# Patient Record
Sex: MALE | Employment: FULL TIME | URBAN - METROPOLITAN AREA
[De-identification: names, ages, dates, MRNs, and addresses within clinical notes are randomized per-mention and may not be internally consistent; named-entity substitution may affect disease eponyms.]

---

## 2017-01-25 ENCOUNTER — OFFICE VISIT (OUTPATIENT)
Dept: URGENT CARE | Facility: PHYSICIAN GROUP | Age: 27
End: 2017-01-25
Payer: COMMERCIAL

## 2017-01-25 VITALS
WEIGHT: 190 LBS | SYSTOLIC BLOOD PRESSURE: 120 MMHG | RESPIRATION RATE: 14 BRPM | HEART RATE: 62 BPM | TEMPERATURE: 97.9 F | OXYGEN SATURATION: 99 % | BODY MASS INDEX: 26.6 KG/M2 | DIASTOLIC BLOOD PRESSURE: 78 MMHG | HEIGHT: 71 IN

## 2017-01-25 DIAGNOSIS — T14.8XXA HEMATOMA: ICD-10-CM

## 2017-01-25 PROCEDURE — 99214 OFFICE O/P EST MOD 30 MIN: CPT | Performed by: FAMILY MEDICINE

## 2017-01-25 ASSESSMENT — ENCOUNTER SYMPTOMS
WEAKNESS: 0
NUMBNESS: 0
JOINT SWELLING: 0

## 2017-01-25 NOTE — PATIENT INSTRUCTIONS
Hematoma  A hematoma is a collection of blood under the skin, in an organ, in a body space, in a joint space, or in other tissue. The blood can clot to form a lump that you can see and feel. The lump is often firm and may sometimes become sore and tender. Most hematomas get better in a few days to weeks. However, some hematomas may be serious and require medical care. Hematomas can range in size from very small to very large.  CAUSES   A hematoma can be caused by a blunt or penetrating injury. It can also be caused by spontaneous leakage from a blood vessel under the skin. Spontaneous leakage from a blood vessel is more likely to occur in older people, especially those taking blood thinners. Sometimes, a hematoma can develop after certain medical procedures.  SIGNS AND SYMPTOMS   · A firm lump on the body.  · Possible pain and tenderness in the area.  · Bruising. Blue, dark blue, purple-red, or yellowish skin may appear at the site of the hematoma if the hematoma is close to the surface of the skin.  For hematomas in deeper tissues or body spaces, the signs and symptoms may be subtle. For example, an intra-abdominal hematoma may cause abdominal pain, weakness, fainting, and shortness of breath. An intracranial hematoma may cause a headache or symptoms such as weakness, trouble speaking, or a change in consciousness.  DIAGNOSIS   A hematoma can usually be diagnosed based on your medical history and a physical exam. Imaging tests may be needed if your health care provider suspects a hematoma in deeper tissues or body spaces, such as the abdomen, head, or chest. These tests may include ultrasonography or a CT scan.   TREATMENT   Hematomas usually go away on their own over time. Rarely does the blood need to be drained out of the body. Large hematomas or those that may affect vital organs will sometimes need surgical drainage or monitoring.  HOME CARE INSTRUCTIONS   · Apply ice to the injured area:    ¨ Put ice in a  plastic bag.    ¨ Place a towel between your skin and the bag.    ¨ Leave the ice on for 20 minutes, 2-3 times a day for the first 1 to 2 days.    · After the first 2 days, switch to using warm compresses on the hematoma.    · Elevate the injured area to help decrease pain and swelling. Wrapping the area with an elastic bandage may also be helpful. Compression helps to reduce swelling and promotes shrinking of the hematoma. Make sure the bandage is not wrapped too tight.    · If your hematoma is on a lower extremity and is painful, crutches may be helpful for a couple days.    · Only take over-the-counter or prescription medicines as directed by your health care provider.  SEEK IMMEDIATE MEDICAL CARE IF:   · You have increasing pain, or your pain is not controlled with medicine.    · You have a fever.    · You have worsening swelling or discoloration.    · Your skin over the hematoma breaks or starts bleeding.    · Your hematoma is in your chest or abdomen and you have weakness, shortness of breath, or a change in consciousness.  · Your hematoma is on your scalp (caused by a fall or injury) and you have a worsening headache or a change in alertness or consciousness.  MAKE SURE YOU:   · Understand these instructions.  · Will watch your condition.  · Will get help right away if you are not doing well or get worse.     This information is not intended to replace advice given to you by your health care provider. Make sure you discuss any questions you have with your health care provider.     Document Released: 08/01/2005 Document Revised: 08/20/2014 Document Reviewed: 05/28/2014  [employer] Interactive Patient Education ©2016 [employer]

## 2017-01-25 NOTE — MR AVS SNAPSHOT
"        Vic Farrisi   2017 7:45 AM   Office Visit   MRN: [id number]    Department:  [facility]   Dept Phone:  [phone]    Description:  Male : 1990   Provider:  Vijay Linares M.D.           Reason for Visit     Foot Problem Left foot injury x 1 week. Now presents with foot pain and significant discoloration.      Allergies as of 2017     No Known Allergies      You were diagnosed with     Hematoma   [[id number]]         Vital Signs     Blood Pressure Pulse Temperature Respirations Height Weight    120/78 mmHg 62 36.6 °C (97.9 °F) 14 1.803 m (5' 11\") 86.183 kg (190 lb)    Body Mass Index Oxygen Saturation                26.51 kg/m2 99%          Basic Information     Date Of Birth Sex Race Ethnicity Preferred Language    1990 Male  or  Unknown English      Health Maintenance        Date Due Completion Dates    IMM DTaP/Tdap/Td Vaccine (1 - Tdap) 2007 ---    IMM INFLUENZA (1) 2016 ---            Current Immunizations     No immunizations on file.      Below and/or attached are the medications your provider expects you to take. Review all of your home medications and newly ordered medications with your provider and/or pharmacist. Follow medication instructions as directed by your provider and/or pharmacist. Please keep your medication list with you and share with your provider. Update the information when medications are discontinued, doses are changed, or new medications (including over-the-counter products) are added; and carry medication information at all times in the event of emergency situations     Allergies:  No Known Allergies          Medications  Valid as of: 2017 -  7:08 PM    Generic Name Brand Name Tablet Size Instructions for use    Azithromycin (Tab) ZITHROMAX 250 MG Take as directed        Guaifenesin-Codeine (Solution) ROBITUSSIN -10 mg/5mL Take 5 mL by mouth every four hours as needed for Cough.        .                 Medicines " prescribed today were sent to:     [facility] [address] [place] - [address]    [address] [place] [address]    Phone: [phone] Fax: [phone]    Open 24 Hours?: No      Medication refill instructions:       If your prescription bottle indicates you have medication refills left, it is not necessary to call your provider’s office. Please contact your pharmacy and they will refill your medication.    If your prescription bottle indicates you do not have any refills left, you may request refills at any time through one of the following ways: The online [employer] system (except Urgent Care), by calling your provider’s office, or by asking your pharmacy to contact your provider’s office with a refill request. Medication refills are processed only during regular business hours and may not be available until the next business day. Your provider may request additional information or to have a follow-up visit with you prior to refilling your medication.   *Please Note: Medication refills are assigned a new Rx number when refilled electronically. Your pharmacy may indicate that no refills were authorized even though a new prescription for the same medication is available at the pharmacy. Please request the medicine by name with the pharmacy before contacting your provider for a refill.        Instructions    Hematoma  A hematoma is a collection of blood under the skin, in an organ, in a body space, in a joint space, or in other tissue. The blood can clot to form a lump that you can see and feel. The lump is often firm and may sometimes become sore and tender. Most hematomas get better in a few days to weeks. However, some hematomas may be serious and require medical care. Hematomas can range in size from very small to very large.  CAUSES   A hematoma can be caused by a blunt or penetrating injury. It can also be caused by spontaneous leakage from a blood vessel under the skin. Spontaneous  leakage from a blood vessel is more likely to occur in older people, especially those taking blood thinners. Sometimes, a hematoma can develop after certain medical procedures.  SIGNS AND SYMPTOMS   · A firm lump on the body.  · Possible pain and tenderness in the area.  · Bruising. Blue, dark blue, purple-red, or yellowish skin may appear at the site of the hematoma if the hematoma is close to the surface of the skin.  For hematomas in deeper tissues or body spaces, the signs and symptoms may be subtle. For example, an intra-abdominal hematoma may cause abdominal pain, weakness, fainting, and shortness of breath. An intracranial hematoma may cause a headache or symptoms such as weakness, trouble speaking, or a change in consciousness.  DIAGNOSIS   A hematoma can usually be diagnosed based on your medical history and a physical exam. Imaging tests may be needed if your health care provider suspects a hematoma in deeper tissues or body spaces, such as the abdomen, head, or chest. These tests may include ultrasonography or a CT scan.   TREATMENT   Hematomas usually go away on their own over time. Rarely does the blood need to be drained out of the body. Large hematomas or those that may affect vital organs will sometimes need surgical drainage or monitoring.  HOME CARE INSTRUCTIONS   · Apply ice to the injured area:    ¨ Put ice in a plastic bag.    ¨ Place a towel between your skin and the bag.    ¨ Leave the ice on for 20 minutes, 2-3 times a day for the first 1 to 2 days.    · After the first 2 days, switch to using warm compresses on the hematoma.    · Elevate the injured area to help decrease pain and swelling. Wrapping the area with an elastic bandage may also be helpful. Compression helps to reduce swelling and promotes shrinking of the hematoma. Make sure the bandage is not wrapped too tight.    · If your hematoma is on a lower extremity and is painful, crutches may be helpful for a couple days.    · Only take  over-the-counter or prescription medicines as directed by your health care provider.  SEEK IMMEDIATE MEDICAL CARE IF:   · You have increasing pain, or your pain is not controlled with medicine.    · You have a fever.    · You have worsening swelling or discoloration.    · Your skin over the hematoma breaks or starts bleeding.    · Your hematoma is in your chest or abdomen and you have weakness, shortness of breath, or a change in consciousness.  · Your hematoma is on your scalp (caused by a fall or injury) and you have a worsening headache or a change in alertness or consciousness.  MAKE SURE YOU:   · Understand these instructions.  · Will watch your condition.  · Will get help right away if you are not doing well or get worse.     This information is not intended to replace advice given to you by your health care provider. Make sure you discuss any questions you have with your health care provider.     Document Released: 08/01/2005 Document Revised: 08/20/2014 Document Reviewed: 05/28/2014  [employer] Interactive Patient Education ©2016 [employer]            [employer] Access Code: Activation code not generated  Current Fio Status: Active

## 2017-01-25 NOTE — PROGRESS NOTES
"Subjective:      Vic White is a 26 y.o. male who presents with Foot Problem            Foot Problem  This is a new problem. The current episode started in the past 7 days. The problem occurs constantly. The problem has been waxing and waning. Pertinent negatives include no joint swelling, numbness or weakness.       Review of Systems   Musculoskeletal: Negative for joint swelling.   Neurological: Negative for weakness and numbness.     No Known Allergies      Objective:     /78 mmHg  Pulse 62  Temp(Src) 36.6 °C (97.9 °F)  Resp 14  Ht 1.803 m (5' 11\")  Wt 86.183 kg (190 lb)  BMI 26.51 kg/m2  SpO2 99%     Physical Exam   Constitutional: He is oriented to person, place, and time. He appears well-developed and well-nourished. No distress.   HENT:   Head: Normocephalic and atraumatic.   Eyes: Conjunctivae are normal. Pupils are equal, round, and reactive to light.   Cardiovascular: Normal rate and regular rhythm.    Pulmonary/Chest: Effort normal and breath sounds normal.   Neurological: He is alert and oriented to person, place, and time.   Skin: Skin is warm and dry. Ecchymosis noted.        Psychiatric: He has a normal mood and affect. Thought content normal.   Vitals reviewed.              Assessment/Plan:     1. Hematoma  Use over-the-counter pain reliever, such as acetaminophen (Tylenol), ibuprofen (Advil, Motrin) or naproxen (Aleve) as needed; follow package directions for dosing. Differential diagnosis, natural history, supportive care, and indications for immediate follow-up discussed.         "

## 2017-12-23 ENCOUNTER — OFFICE VISIT (OUTPATIENT)
Dept: URGENT CARE | Facility: PHYSICIAN GROUP | Age: 27
End: 2017-12-23
Payer: COMMERCIAL

## 2017-12-23 VITALS
BODY MASS INDEX: 28 KG/M2 | DIASTOLIC BLOOD PRESSURE: 68 MMHG | HEART RATE: 70 BPM | OXYGEN SATURATION: 98 % | HEIGHT: 71 IN | SYSTOLIC BLOOD PRESSURE: 112 MMHG | WEIGHT: 200 LBS | RESPIRATION RATE: 14 BRPM | TEMPERATURE: 98.2 F

## 2017-12-23 DIAGNOSIS — J22 LOWER RESPIRATORY INFECTION: ICD-10-CM

## 2017-12-23 PROCEDURE — 99214 OFFICE O/P EST MOD 30 MIN: CPT | Performed by: PHYSICIAN ASSISTANT

## 2017-12-23 RX ORDER — AZITHROMYCIN 250 MG/1
TABLET, FILM COATED ORAL
Qty: 6 TAB | Refills: 0 | Status: SHIPPED | OUTPATIENT
Start: 2017-12-23

## 2017-12-23 RX ORDER — BENZONATATE 100 MG/1
100 CAPSULE ORAL 3 TIMES DAILY PRN
Qty: 60 CAP | Refills: 0 | Status: SHIPPED | OUTPATIENT
Start: 2017-12-23

## 2017-12-29 ASSESSMENT — ENCOUNTER SYMPTOMS
VOMITING: 0
SHORTNESS OF BREATH: 0
WHEEZING: 0
NAUSEA: 0
WEIGHT LOSS: 0
HEMOPTYSIS: 0
DIARRHEA: 0
ABDOMINAL PAIN: 0
HEADACHES: 0
SPUTUM PRODUCTION: 1
MYALGIAS: 0
FEVER: 1
DIZZINESS: 0
SORE THROAT: 0
CHILLS: 0
COUGH: 1

## 2017-12-29 ASSESSMENT — COPD QUESTIONNAIRES: COPD: 0

## 2017-12-29 NOTE — PROGRESS NOTES
"Subjective:      Vic White is a 27 y.o. male who presents with Cough (x3wks)            Cough   This is a new problem. The current episode started 1 to 4 weeks ago (3 weeks). The problem has been unchanged. The problem occurs every few minutes. The cough is productive of sputum. Associated symptoms include a fever, nasal congestion and postnasal drip. Pertinent negatives include no chest pain, chills, ear pain, headaches, hemoptysis, myalgias, rash, sore throat, shortness of breath, weight loss or wheezing. Nothing aggravates the symptoms. Risk factors for lung disease include smoking/tobacco exposure. He has tried OTC cough suppressant for the symptoms. The treatment provided mild relief. There is no history of asthma, bronchitis, COPD or pneumonia.     No history of pneumonia, recent use of antibiotics, or known sick contacts.     Review of Systems   Constitutional: Positive for fever. Negative for chills and weight loss.   HENT: Positive for congestion and postnasal drip. Negative for ear pain and sore throat.    Respiratory: Positive for cough and sputum production. Negative for hemoptysis, shortness of breath and wheezing.    Cardiovascular: Negative for chest pain.   Gastrointestinal: Negative for abdominal pain, diarrhea, nausea and vomiting.   Genitourinary: Negative.    Musculoskeletal: Negative for myalgias.   Skin: Negative for rash.   Neurological: Negative for dizziness and headaches.        Objective:     /68   Pulse 70   Temp 36.8 °C (98.2 °F)   Resp 14   Ht 1.803 m (5' 11\")   Wt 90.7 kg (200 lb)   SpO2 98%   BMI 27.89 kg/m²      Physical Exam   Constitutional: He is oriented to person, place, and time. He appears well-developed and well-nourished. No distress.   HENT:   Head: Normocephalic and atraumatic.   Right Ear: Hearing, tympanic membrane, external ear and ear canal normal.   Left Ear: Hearing, tympanic membrane, external ear and ear canal normal.   Mouth/Throat: Posterior " oropharyngeal erythema present. No oropharyngeal exudate or posterior oropharyngeal edema.   Eyes: Conjunctivae are normal. Pupils are equal, round, and reactive to light. Right eye exhibits no discharge. Left eye exhibits no discharge.   Neck: Normal range of motion.   Cardiovascular: Normal rate, regular rhythm and normal heart sounds.    No murmur heard.  Pulmonary/Chest: Effort normal. No respiratory distress. He has wheezes. He has rales.   Musculoskeletal: Normal range of motion.   Lymphadenopathy:     He has no cervical adenopathy.   Neurological: He is alert and oriented to person, place, and time.   Skin: Skin is warm and dry. He is not diaphoretic.   Psychiatric: He has a normal mood and affect. His behavior is normal.   Nursing note and vitals reviewed.         PMH:  has no past medical history on file.  MEDS:   Current Outpatient Prescriptions:   •  azithromycin (ZITHROMAX) 250 MG Tab, Take 2 tablets on day one, then 1 tablet on days two through five, Disp: 6 Tab, Rfl: 0  •  benzonatate (TESSALON) 100 MG Cap, Take 1 Cap by mouth 3 times a day as needed for Cough., Disp: 60 Cap, Rfl: 0  •  guaifenesin-codeine (CHERATUSSIN AC) Solution, Take 5 mL by mouth every four hours as needed for Cough., Disp: 120 mL, Rfl: 0  ALLERGIES: No Known Allergies  SURGHX: History reviewed. No pertinent surgical history.  SOCHX:  reports that he has been smoking.  He has never used smokeless tobacco. He reports that he drinks alcohol. He reports that he does not use drugs.  FH: family history is not on file.       Assessment/Plan:     1. Lower respiratory infection  - azithromycin (ZITHROMAX) 250 MG Tab; Take 2 tablets on day one, then 1 tablet on days two through five  Dispense: 6 Tab; Refill: 0   - Complete full course of antibiotics as prescribed   - benzonatate (TESSALON) 100 MG Cap; Take 1 Cap by mouth 3 times a day as needed for Cough.  Dispense: 60 Cap; Refill: 0    Call or return to office if symptoms persist or  worsen. The patient demonstrated a good understanding and agreed with the treatment plan.

## 2021-06-22 ENCOUNTER — HOSPITAL ENCOUNTER (EMERGENCY)
Dept: HOSPITAL 8 - ED | Age: 31
Discharge: HOME | End: 2021-06-22
Payer: COMMERCIAL

## 2021-06-22 VITALS — BODY MASS INDEX: 28.58 KG/M2 | HEIGHT: 71 IN | WEIGHT: 204.15 LBS

## 2021-06-22 VITALS — SYSTOLIC BLOOD PRESSURE: 122 MMHG | DIASTOLIC BLOOD PRESSURE: 85 MMHG

## 2021-06-22 DIAGNOSIS — R11.2: ICD-10-CM

## 2021-06-22 DIAGNOSIS — R19.7: ICD-10-CM

## 2021-06-22 DIAGNOSIS — E86.0: Primary | ICD-10-CM

## 2021-06-22 LAB
ALBUMIN SERPL-MCNC: 3.4 G/DL (ref 3.4–5)
ANION GAP SERPL CALC-SCNC: 4 MMOL/L (ref 5–15)
BASOPHILS # BLD AUTO: 0 X10^3/UL (ref 0–0.1)
BASOPHILS NFR BLD AUTO: 1 % (ref 0–1)
CALCIUM SERPL-MCNC: 8.3 MG/DL (ref 8.5–10.1)
CHLORIDE SERPL-SCNC: 106 MMOL/L (ref 98–107)
CREAT SERPL-MCNC: 1.32 MG/DL (ref 0.7–1.3)
EOSINOPHIL # BLD AUTO: 0.1 X10^3/UL (ref 0–0.4)
EOSINOPHIL NFR BLD AUTO: 2 % (ref 1–7)
ERYTHROCYTE [DISTWIDTH] IN BLOOD BY AUTOMATED COUNT: 13.8 % (ref 9.4–14.8)
LYMPHOCYTES # BLD AUTO: 0.8 X10^3/UL (ref 1–3.4)
LYMPHOCYTES NFR BLD AUTO: 14 % (ref 22–44)
MCH RBC QN AUTO: 26.6 PG (ref 27.5–34.5)
MCHC RBC AUTO-ENTMCNC: 34.4 G/DL (ref 33.2–36.2)
MONOCYTES # BLD AUTO: 0.8 X10^3/UL (ref 0.2–0.8)
MONOCYTES NFR BLD AUTO: 13 % (ref 2–9)
NEUTROPHILS # BLD AUTO: 4 X10^3/UL (ref 1.8–6.8)
NEUTROPHILS NFR BLD AUTO: 70 % (ref 42–75)
PLATELET # BLD AUTO: 155 X10^3/UL (ref 130–400)
PMV BLD AUTO: 7.9 FL (ref 7.4–10.4)
RBC # BLD AUTO: 6.33 X10^6/UL (ref 4.38–5.82)

## 2021-06-22 PROCEDURE — 80048 BASIC METABOLIC PNL TOTAL CA: CPT

## 2021-06-22 PROCEDURE — 85025 COMPLETE CBC W/AUTO DIFF WBC: CPT

## 2021-06-22 PROCEDURE — 99283 EMERGENCY DEPT VISIT LOW MDM: CPT

## 2021-06-22 PROCEDURE — 96360 HYDRATION IV INFUSION INIT: CPT

## 2021-06-22 PROCEDURE — 36415 COLL VENOUS BLD VENIPUNCTURE: CPT

## 2021-06-22 PROCEDURE — 82040 ASSAY OF SERUM ALBUMIN: CPT
